# Patient Record
Sex: FEMALE | Race: WHITE | NOT HISPANIC OR LATINO | URBAN - METROPOLITAN AREA
[De-identification: names, ages, dates, MRNs, and addresses within clinical notes are randomized per-mention and may not be internally consistent; named-entity substitution may affect disease eponyms.]

---

## 2021-02-04 RX ORDER — NORGESTIMATE AND ETHINYL ESTRADIOL 7DAYSX3 LO
1 KIT ORAL DAILY
COMMUNITY

## 2021-02-04 ASSESSMENT — ACTIVITIES OF DAILY LIVING (ADL)
ADL_BEFORE_ADMISSION: INDEPENDENT
SENSORY_SUPPORT_DEVICES: EYEGLASSES;CONTACTS
ADL_SCORE: 12

## 2021-02-07 ENCOUNTER — LAB SERVICES (OUTPATIENT)
Dept: LAB | Age: 26
End: 2021-02-07

## 2021-02-07 DIAGNOSIS — Z01.812 PRE-PROCEDURAL LABORATORY EXAMINATIONS: ICD-10-CM

## 2021-02-07 PROCEDURE — U0005 INFEC AGEN DETEC AMPLI PROBE: HCPCS | Performed by: PSYCHIATRY & NEUROLOGY

## 2021-02-07 PROCEDURE — U0003 INFECTIOUS AGENT DETECTION BY NUCLEIC ACID (DNA OR RNA); SEVERE ACUTE RESPIRATORY SYNDROME CORONAVIRUS 2 (SARS-COV-2) (CORONAVIRUS DISEASE [COVID-19]), AMPLIFIED PROBE TECHNIQUE, MAKING USE OF HIGH THROUGHPUT TECHNOLOGIES AS DESCRIBED BY CMS-2020-01-R: HCPCS | Performed by: PSYCHIATRY & NEUROLOGY

## 2021-02-08 LAB
SARS-COV-2 RNA RESP QL NAA+PROBE: NOT DETECTED
SERVICE CMNT-IMP: NORMAL
SERVICE CMNT-IMP: NORMAL

## 2021-02-09 ENCOUNTER — ANESTHESIA (OUTPATIENT)
Dept: SURGERY | Age: 26
End: 2021-02-09

## 2021-02-09 ENCOUNTER — ANESTHESIA EVENT (OUTPATIENT)
Dept: SURGERY | Age: 26
End: 2021-02-09

## 2021-02-09 ENCOUNTER — HOSPITAL ENCOUNTER (OUTPATIENT)
Age: 26
Discharge: HOME | End: 2021-02-09
Attending: PHYSICIAN ASSISTANT | Admitting: PHYSICIAN ASSISTANT

## 2021-02-09 VITALS
SYSTOLIC BLOOD PRESSURE: 139 MMHG | RESPIRATION RATE: 14 BRPM | WEIGHT: 174.82 LBS | DIASTOLIC BLOOD PRESSURE: 91 MMHG | TEMPERATURE: 97.3 F | HEIGHT: 63 IN | HEART RATE: 70 BPM | BODY MASS INDEX: 30.98 KG/M2 | OXYGEN SATURATION: 98 %

## 2021-02-09 DIAGNOSIS — Z01.812 PRE-PROCEDURAL LABORATORY EXAMINATIONS: Primary | ICD-10-CM

## 2021-02-09 LAB
B-HCG UR QL: NEGATIVE
INTERNAL PROCEDURAL CONTROLS ACCEPTABLE: NO

## 2021-02-09 PROCEDURE — 10002800 HB RX 250 W HCPCS: Performed by: ANESTHESIOLOGY

## 2021-02-09 PROCEDURE — 13000034 HB BASIC CASE  S/U +1ST 15 MIN: Performed by: PHYSICIAN ASSISTANT

## 2021-02-09 PROCEDURE — 10004452 HB PACU ADDL 30 MINUTES: Performed by: PHYSICIAN ASSISTANT

## 2021-02-09 PROCEDURE — 10002800 HB RX 250 W HCPCS

## 2021-02-09 PROCEDURE — 10002803 HB RX 637: Performed by: PHYSICIAN ASSISTANT

## 2021-02-09 PROCEDURE — 10002800 HB RX 250 W HCPCS: Performed by: PHYSICIAN ASSISTANT

## 2021-02-09 PROCEDURE — 81025 URINE PREGNANCY TEST: CPT | Performed by: ANESTHESIOLOGY

## 2021-02-09 PROCEDURE — 13000002 HB ANESTHESIA  GENERAL  S/U + 1ST 15 MIN: Performed by: PHYSICIAN ASSISTANT

## 2021-02-09 PROCEDURE — 13000003 HB ANESTHESIA  GENERAL EA ADD MINUTE: Performed by: PHYSICIAN ASSISTANT

## 2021-02-09 PROCEDURE — 10006023 HB SUPPLY 272: Performed by: PHYSICIAN ASSISTANT

## 2021-02-09 PROCEDURE — 10002801 HB RX 250 W/O HCPCS: Performed by: ANESTHESIOLOGY

## 2021-02-09 PROCEDURE — 10004451 HB PACU RECOVERY 1ST 30 MINUTES: Performed by: PHYSICIAN ASSISTANT

## 2021-02-09 PROCEDURE — 10002803 HB RX 637: Performed by: ANESTHESIOLOGY

## 2021-02-09 PROCEDURE — 88305 TISSUE EXAM BY PATHOLOGIST: CPT | Performed by: PHYSICIAN ASSISTANT

## 2021-02-09 PROCEDURE — 10002807 HB RX 258: Performed by: ANESTHESIOLOGY

## 2021-02-09 PROCEDURE — 13000035 HB BASIC CASE EA ADD MINUTE: Performed by: PHYSICIAN ASSISTANT

## 2021-02-09 PROCEDURE — 13000001 HB PHASE II RECOVERY EA 30 MINUTES: Performed by: PHYSICIAN ASSISTANT

## 2021-02-09 PROCEDURE — 10002801 HB RX 250 W/O HCPCS: Performed by: PHYSICIAN ASSISTANT

## 2021-02-09 PROCEDURE — 10002803 HB RX 637

## 2021-02-09 RX ORDER — DIPHENHYDRAMINE HYDROCHLORIDE 50 MG/ML
12.5 INJECTION INTRAMUSCULAR; INTRAVENOUS EVERY 4 HOURS PRN
Status: DISCONTINUED | OUTPATIENT
Start: 2021-02-09 | End: 2021-02-09 | Stop reason: HOSPADM

## 2021-02-09 RX ORDER — HYDROCODONE BITARTRATE AND ACETAMINOPHEN 5; 325 MG/1; MG/1
1-2 TABLET ORAL EVERY 4 HOURS PRN
Qty: 40 TABLET | Refills: 0 | Status: SHIPPED | OUTPATIENT
Start: 2021-02-09

## 2021-02-09 RX ORDER — DEXTROSE MONOHYDRATE 50 MG/ML
INJECTION, SOLUTION INTRAVENOUS CONTINUOUS PRN
Status: DISCONTINUED | OUTPATIENT
Start: 2021-02-09 | End: 2021-02-09 | Stop reason: HOSPADM

## 2021-02-09 RX ORDER — CHLORHEXIDINE GLUCONATE ORAL RINSE 1.2 MG/ML
15 SOLUTION DENTAL
Status: COMPLETED | OUTPATIENT
Start: 2021-02-09 | End: 2021-02-09

## 2021-02-09 RX ORDER — PROCHLORPERAZINE EDISYLATE 5 MG/ML
5 INJECTION INTRAMUSCULAR; INTRAVENOUS EVERY 4 HOURS PRN
Status: DISCONTINUED | OUTPATIENT
Start: 2021-02-09 | End: 2021-02-09 | Stop reason: HOSPADM

## 2021-02-09 RX ORDER — DEXAMETHASONE SODIUM PHOSPHATE 10 MG/ML
INJECTION, SOLUTION INTRAMUSCULAR; INTRAVENOUS PRN
Status: DISCONTINUED | OUTPATIENT
Start: 2021-02-09 | End: 2021-02-09

## 2021-02-09 RX ORDER — DIPHENHYDRAMINE HYDROCHLORIDE 50 MG/ML
25 INJECTION INTRAMUSCULAR; INTRAVENOUS EVERY 4 HOURS PRN
Status: DISCONTINUED | OUTPATIENT
Start: 2021-02-09 | End: 2021-02-09 | Stop reason: HOSPADM

## 2021-02-09 RX ORDER — HYDRALAZINE HYDROCHLORIDE 20 MG/ML
5 INJECTION INTRAMUSCULAR; INTRAVENOUS EVERY 10 MIN PRN
Status: DISCONTINUED | OUTPATIENT
Start: 2021-02-09 | End: 2021-02-09 | Stop reason: HOSPADM

## 2021-02-09 RX ORDER — DEXTROSE MONOHYDRATE 25 G/50ML
25 INJECTION, SOLUTION INTRAVENOUS PRN
Status: DISCONTINUED | OUTPATIENT
Start: 2021-02-09 | End: 2021-02-09 | Stop reason: HOSPADM

## 2021-02-09 RX ORDER — PROPOFOL 10 MG/ML
INJECTION, EMULSION INTRAVENOUS PRN
Status: DISCONTINUED | OUTPATIENT
Start: 2021-02-09 | End: 2021-02-09

## 2021-02-09 RX ORDER — DIPHENHYDRAMINE HYDROCHLORIDE 50 MG/ML
25 INJECTION INTRAMUSCULAR; INTRAVENOUS
Status: DISCONTINUED | OUTPATIENT
Start: 2021-02-09 | End: 2021-02-09 | Stop reason: HOSPADM

## 2021-02-09 RX ORDER — LIDOCAINE HYDROCHLORIDE 10 MG/ML
5-10 INJECTION, SOLUTION INFILTRATION; PERINEURAL PRN
Status: DISCONTINUED | OUTPATIENT
Start: 2021-02-09 | End: 2021-02-09 | Stop reason: HOSPADM

## 2021-02-09 RX ORDER — TRAMADOL HYDROCHLORIDE 50 MG/1
50 TABLET ORAL EVERY 6 HOURS PRN
Status: DISCONTINUED | OUTPATIENT
Start: 2021-02-09 | End: 2021-02-09 | Stop reason: HOSPADM

## 2021-02-09 RX ORDER — ONDANSETRON 2 MG/ML
INJECTION INTRAMUSCULAR; INTRAVENOUS PRN
Status: DISCONTINUED | OUTPATIENT
Start: 2021-02-09 | End: 2021-02-09

## 2021-02-09 RX ORDER — HUMAN INSULIN 100 [IU]/ML
INJECTION, SOLUTION SUBCUTANEOUS
Status: DISCONTINUED | OUTPATIENT
Start: 2021-02-09 | End: 2021-02-09 | Stop reason: HOSPADM

## 2021-02-09 RX ORDER — SODIUM CHLORIDE 9 MG/ML
INJECTION, SOLUTION INTRAVENOUS CONTINUOUS
Status: DISCONTINUED | OUTPATIENT
Start: 2021-02-09 | End: 2021-02-09 | Stop reason: HOSPADM

## 2021-02-09 RX ORDER — ONDANSETRON 2 MG/ML
4 INJECTION INTRAMUSCULAR; INTRAVENOUS 2 TIMES DAILY PRN
Status: DISCONTINUED | OUTPATIENT
Start: 2021-02-09 | End: 2021-02-09 | Stop reason: HOSPADM

## 2021-02-09 RX ORDER — MIDAZOLAM HYDROCHLORIDE 1 MG/ML
INJECTION, SOLUTION INTRAMUSCULAR; INTRAVENOUS PRN
Status: DISCONTINUED | OUTPATIENT
Start: 2021-02-09 | End: 2021-02-09

## 2021-02-09 RX ORDER — MIDAZOLAM HYDROCHLORIDE 2 MG/2ML
INJECTION, SOLUTION INTRAMUSCULAR; INTRAVENOUS
Status: DISCONTINUED
Start: 2021-02-09 | End: 2021-02-09 | Stop reason: HOSPADM

## 2021-02-09 RX ORDER — METOCLOPRAMIDE HYDROCHLORIDE 5 MG/ML
5 INJECTION INTRAMUSCULAR; INTRAVENOUS EVERY 6 HOURS PRN
Status: DISCONTINUED | OUTPATIENT
Start: 2021-02-09 | End: 2021-02-09 | Stop reason: HOSPADM

## 2021-02-09 RX ORDER — HYDROCODONE BITARTRATE AND ACETAMINOPHEN 5; 325 MG/1; MG/1
TABLET ORAL
Status: COMPLETED
Start: 2021-02-09 | End: 2021-02-09

## 2021-02-09 RX ORDER — HYDROCODONE BITARTRATE AND ACETAMINOPHEN 5; 325 MG/1; MG/1
2 TABLET ORAL ONCE
Status: COMPLETED | OUTPATIENT
Start: 2021-02-09 | End: 2021-02-09

## 2021-02-09 RX ORDER — LIDOCAINE HYDROCHLORIDE 10 MG/ML
INJECTION, SOLUTION INFILTRATION; PERINEURAL PRN
Status: DISCONTINUED | OUTPATIENT
Start: 2021-02-09 | End: 2021-02-09

## 2021-02-09 RX ORDER — DEXAMETHASONE SODIUM PHOSPHATE 4 MG/ML
4 INJECTION, SOLUTION INTRA-ARTICULAR; INTRALESIONAL; INTRAMUSCULAR; INTRAVENOUS; SOFT TISSUE
Status: DISCONTINUED | OUTPATIENT
Start: 2021-02-09 | End: 2021-02-09 | Stop reason: HOSPADM

## 2021-02-09 RX ORDER — MIDAZOLAM HYDROCHLORIDE 1 MG/ML
1 INJECTION, SOLUTION INTRAMUSCULAR; INTRAVENOUS ONCE
Status: DISCONTINUED | OUTPATIENT
Start: 2021-02-09 | End: 2021-02-09 | Stop reason: HOSPADM

## 2021-02-09 RX ORDER — SODIUM CHLORIDE, SODIUM LACTATE, POTASSIUM CHLORIDE, CALCIUM CHLORIDE 600; 310; 30; 20 MG/100ML; MG/100ML; MG/100ML; MG/100ML
INJECTION, SOLUTION INTRAVENOUS CONTINUOUS
Status: DISCONTINUED | OUTPATIENT
Start: 2021-02-09 | End: 2021-02-09 | Stop reason: HOSPADM

## 2021-02-09 RX ADMIN — FENTANYL CITRATE 50 MCG: 50 INJECTION INTRAMUSCULAR; INTRAVENOUS at 11:41

## 2021-02-09 RX ADMIN — HYDROCODONE BITARTRATE AND ACETAMINOPHEN 1 TABLET: 5; 325 TABLET ORAL at 15:36

## 2021-02-09 RX ADMIN — CHLORHEXIDINE GLUCONATE 15 ML: 1.2 RINSE ORAL at 08:48

## 2021-02-09 RX ADMIN — PROPOFOL 200 MG: 10 INJECTION, EMULSION INTRAVENOUS at 09:31

## 2021-02-09 RX ADMIN — HYDROCODONE BITARTRATE AND ACETAMINOPHEN 2 TABLET: 5; 325 TABLET ORAL at 11:52

## 2021-02-09 RX ADMIN — FENTANYL CITRATE 50 MCG: 50 INJECTION, SOLUTION INTRAMUSCULAR; INTRAVENOUS at 10:05

## 2021-02-09 RX ADMIN — MORPHINE SULFATE 2 MG: 10 INJECTION INTRAVENOUS at 13:28

## 2021-02-09 RX ADMIN — CEFAZOLIN SODIUM 2000 MG: 300 INJECTION, POWDER, LYOPHILIZED, FOR SOLUTION INTRAVENOUS at 09:32

## 2021-02-09 RX ADMIN — HYDROMORPHONE HYDROCHLORIDE 0.4 MG: 1 INJECTION, SOLUTION INTRAMUSCULAR; INTRAVENOUS; SUBCUTANEOUS at 11:23

## 2021-02-09 RX ADMIN — HYDRALAZINE HYDROCHLORIDE 5 MG: 20 INJECTION INTRAMUSCULAR; INTRAVENOUS at 11:59

## 2021-02-09 RX ADMIN — HYDROMORPHONE HYDROCHLORIDE 0.4 MG: 1 INJECTION, SOLUTION INTRAMUSCULAR; INTRAVENOUS; SUBCUTANEOUS at 11:30

## 2021-02-09 RX ADMIN — MIDAZOLAM HYDROCHLORIDE 2 MG: 1 INJECTION, SOLUTION INTRAMUSCULAR; INTRAVENOUS at 09:25

## 2021-02-09 RX ADMIN — LIDOCAINE HYDROCHLORIDE 50 MG: 10 INJECTION, SOLUTION INFILTRATION; PERINEURAL at 09:31

## 2021-02-09 RX ADMIN — TRAMADOL HYDROCHLORIDE 50 MG: 50 TABLET, FILM COATED ORAL at 14:09

## 2021-02-09 RX ADMIN — FENTANYL CITRATE 25 MCG: 50 INJECTION INTRAMUSCULAR; INTRAVENOUS at 12:14

## 2021-02-09 RX ADMIN — HYDROMORPHONE HYDROCHLORIDE 0.2 MG: 1 INJECTION, SOLUTION INTRAMUSCULAR; INTRAVENOUS; SUBCUTANEOUS at 11:37

## 2021-02-09 RX ADMIN — ONDANSETRON 4 MG: 2 INJECTION INTRAMUSCULAR; INTRAVENOUS at 10:47

## 2021-02-09 RX ADMIN — FENTANYL CITRATE 25 MCG: 50 INJECTION INTRAMUSCULAR; INTRAVENOUS at 12:43

## 2021-02-09 RX ADMIN — FENTANYL CITRATE 50 MCG: 50 INJECTION, SOLUTION INTRAMUSCULAR; INTRAVENOUS at 10:40

## 2021-02-09 RX ADMIN — FENTANYL CITRATE 25 MCG: 50 INJECTION INTRAMUSCULAR; INTRAVENOUS at 12:05

## 2021-02-09 RX ADMIN — FENTANYL CITRATE 50 MCG: 50 INJECTION, SOLUTION INTRAMUSCULAR; INTRAVENOUS at 09:53

## 2021-02-09 RX ADMIN — DEXAMETHASONE SODIUM PHOSPHATE 10 MG: 10 INJECTION, SOLUTION INTRAMUSCULAR; INTRAVENOUS at 10:47

## 2021-02-09 RX ADMIN — FENTANYL CITRATE 50 MCG: 50 INJECTION, SOLUTION INTRAMUSCULAR; INTRAVENOUS at 09:58

## 2021-02-09 RX ADMIN — SODIUM CHLORIDE, POTASSIUM CHLORIDE, SODIUM LACTATE AND CALCIUM CHLORIDE: 600; 310; 30; 20 INJECTION, SOLUTION INTRAVENOUS at 09:25

## 2021-02-09 RX ADMIN — FENTANYL CITRATE 50 MCG: 50 INJECTION, SOLUTION INTRAMUSCULAR; INTRAVENOUS at 09:25

## 2021-02-09 RX ADMIN — FENTANYL CITRATE 25 MCG: 50 INJECTION INTRAMUSCULAR; INTRAVENOUS at 13:16

## 2021-02-09 RX ADMIN — FENTANYL CITRATE 25 MCG: 50 INJECTION INTRAMUSCULAR; INTRAVENOUS at 12:20

## 2021-02-09 RX ADMIN — FENTANYL CITRATE 50 MCG: 50 INJECTION, SOLUTION INTRAMUSCULAR; INTRAVENOUS at 10:32

## 2021-02-09 ASSESSMENT — PAIN SCALES - GENERAL
PAINLEVEL_OUTOF10: 6
PAINLEVEL_OUTOF10: 6
PAINLEVEL_OUTOF10: 5
PAINLEVEL_OUTOF10: 8
PAINLEVEL_OUTOF10: 4
PAINLEVEL_OUTOF10: 7
PAINLEVEL_OUTOF10: 5
PAINLEVEL_OUTOF10: 8
PAINLEVEL_OUTOF10: 0
PAINLEVEL_OUTOF10: 3
PAINLEVEL_OUTOF10: 3
PAINLEVEL_OUTOF10: 5
PAINLEVEL_OUTOF10: 8
PAINLEVEL_OUTOF10: 8

## 2021-02-11 LAB
ASR DISCLAIMER: NORMAL
CASE RPRT: NORMAL
CLINICAL INFO: NORMAL
PATH REPORT.FINAL DX SPEC: NORMAL
PATH REPORT.GROSS SPEC: NORMAL

## 2024-03-11 ENCOUNTER — OFFICE VISIT (OUTPATIENT)
Dept: ENDOCRINOLOGY CLINIC | Facility: CLINIC | Age: 29
End: 2024-03-11

## 2024-03-11 ENCOUNTER — LAB ENCOUNTER (OUTPATIENT)
Dept: LAB | Facility: HOSPITAL | Age: 29
End: 2024-03-11
Attending: INTERNAL MEDICINE
Payer: COMMERCIAL

## 2024-03-11 VITALS
WEIGHT: 196 LBS | SYSTOLIC BLOOD PRESSURE: 127 MMHG | BODY MASS INDEX: 34.73 KG/M2 | HEART RATE: 80 BPM | HEIGHT: 63 IN | DIASTOLIC BLOOD PRESSURE: 92 MMHG

## 2024-03-11 DIAGNOSIS — R73.03 PREDIABETES: ICD-10-CM

## 2024-03-11 DIAGNOSIS — I10 HYPERTENSION, UNSPECIFIED TYPE: ICD-10-CM

## 2024-03-11 DIAGNOSIS — E66.9 CLASS 1 OBESITY WITH BODY MASS INDEX (BMI) OF 34.0 TO 34.9 IN ADULT, UNSPECIFIED OBESITY TYPE, UNSPECIFIED WHETHER SERIOUS COMORBIDITY PRESENT: ICD-10-CM

## 2024-03-11 DIAGNOSIS — R79.89 ELEVATED PLASMA METANEPHRINES: Primary | ICD-10-CM

## 2024-03-11 DIAGNOSIS — R79.89 ELEVATED PLASMA METANEPHRINES: ICD-10-CM

## 2024-03-11 DIAGNOSIS — E61.1 LOW SERUM IRON: ICD-10-CM

## 2024-03-11 LAB
EST. AVERAGE GLUCOSE BLD GHB EST-MCNC: 117 MG/DL (ref 68–126)
HBA1C MFR BLD: 5.7 % (ref ?–5.7)

## 2024-03-11 PROCEDURE — 36415 COLL VENOUS BLD VENIPUNCTURE: CPT

## 2024-03-11 PROCEDURE — 83835 ASSAY OF METANEPHRINES: CPT

## 2024-03-11 PROCEDURE — 83036 HEMOGLOBIN GLYCOSYLATED A1C: CPT

## 2024-03-11 RX ORDER — SPIRONOLACTONE 100 MG/1
TABLET, FILM COATED ORAL
COMMUNITY

## 2024-03-11 RX ORDER — ACETAMINOPHEN AND CODEINE PHOSPHATE 120; 12 MG/5ML; MG/5ML
0.35 SOLUTION ORAL DAILY
COMMUNITY

## 2024-03-11 RX ORDER — NIFEDIPINE 30 MG/1
30 TABLET, EXTENDED RELEASE ORAL DAILY
COMMUNITY
Start: 2023-12-26

## 2024-03-11 NOTE — PATIENT INSTRUCTIONS
Will do labs to reassess metanephrines and A1c   If labs come back normal metanephrines then it is not pheo and most likely d/t stress    Will repeat A1c also     F/u with PCP and a therapist for coping skills

## 2024-03-11 NOTE — PROGRESS NOTES
New Patient Evaluation - History and Physical    CONSULT - Reason for Visit:  high metanephrines.  Requesting Physician:  Louisa Blancas MD  ..No primary care provider on file.      CHIEF COMPLAINT:    Chief Complaint   Patient presents with    Consult     Pheochromocytoma        HISTORY OF PRESENT ILLNESS:   Lyn Thakkar is a 28 year old female who presents with concerns she has pheochromocytoma.   She has KIRBY, MDD , preDM, HTN and low iron levels     She was seen with her mother today  She had w/u done outside. We reviewed and discussed    High blood pressure was dx last year  2023 was dx with ADD was started on Adderall stopped it deu high BP in Summer 2023      Switched her birth control     Now on nifedipine, spironlactone (was on it before for acne) and norehinidone     Has stress now at work      Lab Results   Component Value Date    A1C 5.7 (H) 03/11/2024        LMP 2 weeks ago, on OCP      ASSESSMENT AND PLAN:  Lyn Thakkar is a 28 year old female who presents with concerns she has pheochromocytoma.   She has KIRBY, MDD , preDM, HTN and low iron levels   Her sx are mainly d/t stress at work.  Unlikley she has pheo. I d/w pt nd her mother in [facility]  W/u showed normal thyroid and cortisol levels   Normal serum MNM and MN   Urine showed normal MN.   NMN in 24 hr urine is high but nonspecific and below the level to dx pheo      Labs showed normal serum MN 39,  in 11/2023,  Jamie 3,    Normal urine cortisol (37, 21) 11/2023 x2  High 24 hr urine ,  in 1/2024. ,  in 11/2023   TSH 1.68    plan  Will do labs to reassess metanephrines and A1c   If labs come back normal metanephrines then it is not pheo and most likely d/t stress    Will repeat A1c also     F/u with PCP and a therapist for coping skills       PAST MEDICAL HISTORY:   History reviewed. No pertinent past medical history.  HTN  KIRBY   Low iron    PAST SURGICAL HISTORY:   History reviewed. No pertinent surgical  history.  ACL repair   Breast reduction   [place] teeth   Nose repair     CURRENT MEDICATIONS:     NIFEdipine ER 30 MG Oral Tablet 24 Hr Take 1 tablet (30 mg total) by mouth daily.      Norethindrone 0.35 MG Oral Tab Take 1 tablet (0.35 mg total) by mouth daily.      spironolactone 100 MG Oral Tab            ALLERGIES:  Not on File  None    SOCIAL HISTORY:    Social History     Socioeconomic History    Marital status: Single   Tobacco Use    Smoking status: Never    Smokeless tobacco: Never   Vaping Use    Vaping Use: Never used   Substance and Sexual Activity    Alcohol use: Yes     Alcohol/week: 2.0 standard drinks of alcohol     Types: 2 Glasses of wine per week     Comment: socially    Drug use: Yes     Types: Cannabis   Etoh 2 wine glasses /week   Marijuna x3-4/week      FAMILY HISTORY:   History reviewed. No pertinent family history.   F HTN   M aunt with thyroid disease    No CAD or sudden death     REVIEW OF SYSTEMS:  All negative other than HPI      PHYSICAL EXAM:   Height: 5' 3\" (160 cm) (03/11 1724)  Weight: 196 lb (88.9 kg) (03/11 1724)  BSA (Calculated - sq m): 1.92 sq meters (03/11 1724)  Pulse: 80 (03/11 1724)  BP: 127/92 (03/11 1724)  Temp: --  Do Not Use - Resp Rate: --  SpO2: --    Body mass index is 34.72 kg/m².  No tremors   No goiter   Denied striae- reviewed images   CONSTITUTIONAL:  Awake and alert. Age appropriate, good hygiene not in acute distress. Well nourished and well developed. no acute distress   PSYCH:   Orientated to time, place, person & situation, Normal mood and affect, memory intact, normal insight and judgment, cooperative  Neuro: speech is clear. Awake, alert, no aphasia, no facial asymmetry, no nuchal rigidity  EYES:  No proptosis, no ptosis, conjunctiva normal  ENT:  Normocephalic, atraumatic  Eye: EOMI, normal lids, no discharge, no conjunctival erythema. No exophthalmos/proptosis, Ptosis negative   No rhinorrhea, moist oral mucosa  Neck: full range of motion  Neck/Thyroid:  neck inspection: normal, No scar, No goiter   LUNGS:  No acute respiratory distress, non-labored respiration. Speaking full sentences  CARDIOVASCULAR:  regular rate   ABDOMEN:  No abdominal pain.   SKIN:  no bruising or bleeding, no rashes and no lesions, Skin is dry, no obvious rashes or lesions  EXTREMITIES: no gross abnormality   MSK: Moves extremities spontaneously. full range of motion in all major joints      DATA:     Pertinent data reviewed       171 -- -- -- 197 -- -- -- -- -- -- --     Normetanephrine -- 460 High      -- -- -- 489 High      -- -- -- -- -- -- --   Metanephrine, Total -- 631 High      -- -- -- 686 High                     -- -- -- --            Normetanephrine, Free -- -- -- -- -- -- -- -- 139 -- -- -- --   Total, Free (MN + NMN) -- -- -- -- -- -- -- -- 178 -- -- -- --   Aldosterone -- -- -- -- -- -- -- -- -- 3        Metanephrines, Plasma -- -- -- -- -- -- -- -- 39      Latest Reference Range & Units 08/01/23 11:51   External HGBA1C 4.0 - 5.6 % 5.9 (H) (E)      Cortisol, Free, Urine -- -- -- -- -- -- 37.9 21.9 -- -- -- -- --   Creatinine, Urine -- -- -- -- -- -- 1.73 -- -- -- -- -- --         Ct head 2012  IMPRESSION:        No CT evidence for any acute intracranial process or acute      intracranial hemorrhage.       No results for input(s): \"TSH\", \"T4F\", \"T3F\", \"THYP\" in the last 72 hours.  No results found.    Hemoglobin A1C [E]        Component Value Flag Ref Range Units Status    HgbA1C 5.7      <5.7 % Final    Comment:     Normal HbA1C:     <5.7%      Pre-Diabetic:     5.7 - 6.4%      Diabetic:         >6.4%      Diabetic Control: <7.0%        Estimated Average Glucose 117      68 - 126 mg/dL Final    Comment:    eAG is the estimated average glucose calculated from Hgb A1c according to the formula recommended by the American Diabetes Association. eAG levels reflect the long term average glucose and may not correlate with random or fasting glucose levels since these represent specific  points in time.                           Orders Placed This Encounter   Procedures    Hemoglobin A1C [E]    Metanephrines, Plasma Free     Orders Placed This Encounter    Hemoglobin A1C [E]     Standing Status:   Future     Number of Occurrences:   1     Standing Expiration Date:   3/11/2025     Order Specific Question:   Release to patient     Answer:   Immediate    Metanephrines, Plasma Free     Standing Status:   Future     Number of Occurrences:   1     Standing Expiration Date:   3/11/2025    NIFEdipine ER 30 MG Oral Tablet 24 Hr     Sig: Take 1 tablet (30 mg total) by mouth daily.    Norethindrone 0.35 MG Oral Tab     Sig: Take 1 tablet (0.35 mg total) by mouth daily.    spironolactone 100 MG Oral Tab          This is a specialized patient consultation in endocrinology and required comprehensive review of prior records, as well as current evaluation, with time required for consideration of complex endocrine issues and consultation. For this visit, I personally interviewed the patient, and family member if accompanied, performed the pertinent parts of the history and physical examination. ROS included screening for appropriate endocrine conditions.   Today's diagnosis and plan were reviewed in detail with the patient who states understanding and agrees with plan. I discussed with the patient possible diagnosis, differential diagnosis, need for work up , treatment options, alternatives and side effects.     Please see note for details about time spent which includes:   · pre-visit preparation  · reviewing records  · face to face time with the patient   · timely documentation of the encounter  · ordering medications/tests  · communication with care team  · care coordination    I appreciate the opportunity to be part of your patient's medical care and will keep you, as the referring and primary physicians, informed about the care of your patient, including possible future surgery and pathology findings. Please  feel free to contact me should you have any questions.      Tone Phan MD

## 2024-03-18 LAB
METANEPHRINE: 32 PG/ML
NORMETANEPHRINE: 75.7 PG/ML

## (undated) DEVICE — Device